# Patient Record
Sex: FEMALE | Race: WHITE | NOT HISPANIC OR LATINO | Employment: OTHER | URBAN - METROPOLITAN AREA
[De-identification: names, ages, dates, MRNs, and addresses within clinical notes are randomized per-mention and may not be internally consistent; named-entity substitution may affect disease eponyms.]

---

## 2017-02-14 DIAGNOSIS — R07.81 PLEURITIC CHEST PAIN: Primary | ICD-10-CM

## 2017-03-14 ENCOUNTER — OFFICE VISIT (OUTPATIENT)
Dept: SURGERY | Facility: CLINIC | Age: 72
End: 2017-03-14

## 2017-03-14 VITALS
HEART RATE: 70 BPM | WEIGHT: 127 LBS | RESPIRATION RATE: 16 BRPM | DIASTOLIC BLOOD PRESSURE: 60 MMHG | HEIGHT: 65 IN | SYSTOLIC BLOOD PRESSURE: 122 MMHG | OXYGEN SATURATION: 99 % | BODY MASS INDEX: 21.16 KG/M2

## 2017-03-14 DIAGNOSIS — D36.10 NEURILEMMOMA: Primary | ICD-10-CM

## 2017-03-14 PROCEDURE — 99214 OFFICE O/P EST MOD 30 MIN: CPT | Performed by: THORACIC SURGERY (CARDIOTHORACIC VASCULAR SURGERY)

## 2017-03-14 RX ORDER — CHLORAL HYDRATE 500 MG
CAPSULE ORAL DAILY
COMMUNITY
Start: 2014-09-17

## 2017-03-14 RX ORDER — MULTIVIT WITH MINERALS/LUTEIN
TABLET ORAL DAILY
COMMUNITY
Start: 2013-11-05

## 2017-03-14 NOTE — PROGRESS NOTES
Subjective   Patient ID: Edyta White is a 71 y.o. female who presents to the office today for a follow up visit after worsening right sided chest wall pain ct chest without contrast.    History of Present Illness  Dear Colleague,  Edyta White was seen in our office today for evaluation of new onset right posterior chest wall pain associated with numbness and paresthesias.  I have seen and examined the patient.  I have reviewed her x-rays.    Mrs. White is 71-year-old  female is well known to me.  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  I followed her for several years afterwards with no evidence of recurrence.  I released her from my care.  Recently she has noticed some discomfort in her right posterior chest wall.  It is intermittent.  This is between the spine and the scapula.  It is associated with a numbness and paresthesias.  It does not radiate.  Nothing seems to make it worse or better.  It does not cause shortness of breath.  It is not pleuritic in nature.  She is experiencing no shortness of breath, no cough, and no weight loss.  She has had no fever chills or night sweats.    The following portions of the patient's history were reviewed and updated as appropriate: allergies, current medications, past family history, past medical history, past social history, past surgical history and problem list.  Review of Systems   Constitution: Negative.   HENT: Negative.    Eyes: Negative.    Cardiovascular: Negative.    Respiratory: Negative.         Right sided chest wall pain   Endocrine: Negative.    Hematologic/Lymphatic: Negative.    Skin: Negative.    Musculoskeletal: Negative.    Gastrointestinal: Negative.    Genitourinary: Negative.    Neurological: Negative.    Psychiatric/Behavioral: Negative.      Patient Active Problem List   Diagnosis   • Disease of lung   • Neurilemmoma   • Seasonal allergic rhinitis   • Spinal  stenosis   • Routine health maintenance   • Intermittent palpitations     Past Medical History   Diagnosis Date   • Frozen shoulder    • Mediastinal tumor      Schwannoma    • Seasonal allergies      Past Surgical History   Procedure Laterality Date   • Lung surgery  2011     Removal of schwannoma   • Breast augmentation     • Tubal abdominal ligation       Family History   Problem Relation Age of Onset   • Brain cancer Mother    • Coronary artery disease Father      Social History     Social History   • Marital status:      Spouse name: N/A   • Number of children: N/A   • Years of education: N/A     Occupational History   • Not on file.     Social History Main Topics   • Smoking status: Never Smoker   • Smokeless tobacco: Not on file   • Alcohol use No   • Drug use: Not on file   • Sexual activity: Not on file     Other Topics Concern   • Not on file     Social History Narrative       Current Outpatient Prescriptions:   •  ascorbic acid (VITAMIN C) 1000 MG tablet, Take  by mouth Daily., Disp: , Rfl:   •  Cyanocobalamin (B-12) 1000 MCG tablet controlled-release, Take 1 capsule by mouth., Disp: , Rfl:   •  Multiple Vitamins-Minerals (MULTIVITAL) tablet, Take 1 tablet by mouth daily., Disp: , Rfl:   •  Omega-3 Fatty Acids (FISH OIL) 1000 MG capsule capsule, Take  by mouth Daily., Disp: , Rfl:   Allergies   Allergen Reactions   • Sulfa Antibiotics         Objective   Vitals:    03/14/17 0931   BP: 122/60   Pulse: 70   Resp: 16   SpO2: 99%     Physical Exam   Constitutional: She is oriented to person, place, and time. Vital signs are normal. She appears well-developed.   HENT:   Head: Normocephalic and atraumatic.   Neck: Normal range of motion. Neck supple.   Cardiovascular: Normal rate, regular rhythm, normal heart sounds and intact distal pulses.    No murmur heard.  Pulmonary/Chest: Effort normal and breath sounds normal. She has no wheezes. She has no rhonchi. She has no rales. She exhibits no tenderness.    Abdominal: Soft. There is no tenderness.   Musculoskeletal: Normal range of motion. She exhibits no edema or tenderness.   Neurological: She is alert and oriented to person, place, and time. She has normal strength.   Skin: Skin is warm and dry. No rash noted. No cyanosis or erythema.   Psychiatric: She has a normal mood and affect. Her behavior is normal.     Independent Review of Radiographic Studies:    I have independently reviewed CT scan of the chest performed March 7, 2017.  There is no evidence of recurrence of the posterior mediastinal tumor.  There are no acute findings in the chest.  There is stable bilateral apical scarring with some nodularity that most likely is postinflammatory.  There is remote granulomatous disease. There are multiple small bilateral parenchymal subpleural and pleural-based nodules dating back to 2014.  There is mild bilateral upper lobe cylindrical bronchiectasis.  There is no hilar mediastinal or axillary adenopathy of concern.    Assessment/Plan       I see nothing in the CT scan or on physical exam to explain her symptoms.  They are relatively mild and I believe mainly concerned her for possible recurrence of the tumor.  I reassured her that there was no recurrence and no new tumor.  I suggested that she continue her exercise routine and perhaps use heat to the area as well as deep massage.  If her symptoms persist or worsen I will be glad to see her at anytime in the future.  Thank you for allowing me to participate in the care of Mrs. White.    Diagnoses and all orders for this visit:    Neurilemmoma

## 2017-05-02 ENCOUNTER — OFFICE VISIT (OUTPATIENT)
Dept: INTERNAL MEDICINE | Age: 72
End: 2017-05-02

## 2017-05-02 VITALS
DIASTOLIC BLOOD PRESSURE: 58 MMHG | WEIGHT: 127 LBS | OXYGEN SATURATION: 99 % | TEMPERATURE: 99.3 F | BODY MASS INDEX: 20.41 KG/M2 | HEIGHT: 66 IN | SYSTOLIC BLOOD PRESSURE: 112 MMHG | HEART RATE: 84 BPM

## 2017-05-02 DIAGNOSIS — J06.9 ACUTE URI: Primary | ICD-10-CM

## 2017-05-02 PROCEDURE — 99213 OFFICE O/P EST LOW 20 MIN: CPT | Performed by: INTERNAL MEDICINE

## 2017-05-08 ENCOUNTER — TELEPHONE (OUTPATIENT)
Dept: INTERNAL MEDICINE | Age: 72
End: 2017-05-08

## 2017-05-08 DIAGNOSIS — R05.3 CHRONIC COUGH: Primary | ICD-10-CM

## 2017-07-17 ENCOUNTER — OFFICE VISIT (OUTPATIENT)
Dept: INTERNAL MEDICINE | Age: 72
End: 2017-07-17

## 2017-07-17 VITALS
DIASTOLIC BLOOD PRESSURE: 50 MMHG | OXYGEN SATURATION: 98 % | HEIGHT: 66 IN | WEIGHT: 129.4 LBS | BODY MASS INDEX: 20.79 KG/M2 | SYSTOLIC BLOOD PRESSURE: 112 MMHG | TEMPERATURE: 98.6 F | HEART RATE: 74 BPM

## 2017-07-17 DIAGNOSIS — R00.2 INTERMITTENT PALPITATIONS: ICD-10-CM

## 2017-07-17 DIAGNOSIS — M17.11 ARTHRITIS OF KNEE, RIGHT: ICD-10-CM

## 2017-07-17 DIAGNOSIS — R60.0 LOCALIZED EDEMA: Primary | ICD-10-CM

## 2017-07-17 PROBLEM — J06.9 ACUTE URI: Status: RESOLVED | Noted: 2017-05-02 | Resolved: 2017-07-17

## 2017-07-17 PROCEDURE — 99214 OFFICE O/P EST MOD 30 MIN: CPT | Performed by: INTERNAL MEDICINE

## 2017-07-17 NOTE — PROGRESS NOTES
"Edyta White / 71 y.o. / female  07/17/2017  VITALS    /50  Pulse 74  Temp 98.6 °F (37 °C)  Ht 66\" (167.6 cm)  Wt 129 lb 6.4 oz (58.7 kg)  SpO2 98%  BMI 20.89 kg/m2  BP Readings from Last 3 Encounters:   07/17/17 112/50   05/02/17 112/58   04/29/17 111/68     Wt Readings from Last 3 Encounters:   07/17/17 129 lb 6.4 oz (58.7 kg)   05/02/17 127 lb (57.6 kg)   04/29/17 126 lb (57.2 kg)      Body mass index is 20.89 kg/(m^2).    CC:  Main reason(s) for today's visit: Edema (bilat)      HPI:     Patient notes chronic issue with swelling in both lower extremities..  Over the past several months, this has seemed to get worse.  It is typically worse when she is on her feet for the majority the day and worse at the end of the day.  When she arises in the morning the symptoms have improved.  She does have significant varicosities on the right side, and she does comment that this is the side with the predominance of the edema.  She also notes that her legs feel heavy.  These symptoms seem to extend into the thighs bilaterally.  She notes that the varicosities in the right occurred relatively recently, she does wear compression garments around the knee when she plays golf twice a week for at least 3-4 hours per day.    Palpitations, chronic issue, may have been evaluated prior to 2014 with an event recorder, patient is unsure if that actually happened.  She has no symptoms with this and denied chest pain, shortness of breath or dizziness.  We discussed that the only way to identify an intermittent issue like this is to have an event recorder placed.    Arthritis right knee, status post Synvisc injection ×2 without significant improvement.  Physical therapy is about to begin.  At this point she would like to avoid surgery if at all possible which I think is a logical maneuver.    Patient Care Team:  Zi Walker MD as PCP - General  Zi Walker MD as PCP - Family " Medicine    ____________________________________________________________________    ASSESSMENT & PLAN:    Problem List Items Addressed This Visit        Unprioritized    Intermittent palpitations      Other Visit Diagnoses     Localized edema    -  Primary    Relevant Orders    CT Abdomen Pelvis With Contrast    Arthritis of knee, right            Orders Placed This Encounter   Procedures   • CT Abdomen Pelvis With Contrast       Summary/Discussion:     ·   Number-one edema at this time not significant clinically.  Recommend patient utilize elevation at night, as the weather cools she may consider support stockings which we can provide prescription for.  If the symptoms worsen, we would consider referral on to vascular surgery.  Will also check CT scan abdomen and pelvis because of the relatively rapid onset of varicosities.    #2 intermittent palpitations, not associated with untoward symptoms.  Patient is not inclined to pursue 38 at recorder at this time we'll continue to observe and let me know if they worsen.    #3 arthritis right knee, patient is currently being seen by orthopedics and she is in the process of beginning physical therapy.  She has tried Synvisc injection ×2 with plus minus results.    Return if symptoms worsen or fail to improve.    No future appointments.    ______________________________________________________    REVIEW OF SYSTEMS    Review of Systems   Respiratory: Negative for chest tightness and shortness of breath.    Cardiovascular: Negative for chest pain and palpitations.   Neurological: Negative for dizziness, syncope, speech difficulty, weakness, light-headedness and headaches.           PHYSICAL EXAMINATION    Physical Exam   Constitutional: She is oriented to person, place, and time. She appears well-developed and well-nourished. No distress.   Cardiovascular: Normal rate, regular rhythm, normal heart sounds and intact distal pulses.  Exam reveals no gallop and no friction rub.     No murmur heard.  Pulmonary/Chest: Effort normal and breath sounds normal. She has no wheezes. She has no rales.   Musculoskeletal: She exhibits edema.   Trace edema noted distally lower extremities bilaterally.    Varicose Veins more prominent on the right than the left   Neurological: She is alert and oriented to person, place, and time.   Skin: Skin is warm and dry. No rash noted.   Psychiatric: She has a normal mood and affect. Her behavior is normal. Judgment and thought content normal.   Nursing note and vitals reviewed.        REVIEWED DATA:    Labs:   No results found for: NA, K, AST, ALT, BUN, CREATININE, EGFRIFNONA, EGFRIFAFRI     No results found for: HGBA1C, GLU, MICROALBUR    No results found for: LDL, HDL, TRIG, CHOLHDLRATIO    No results found for: TSH, FREET4     No results found for: WBC, HGB, PLT     Imaging:        Medical Tests:        Summary of old records / correspondence / consultant report:        Request outside records:        Allergies   Allergen Reactions   • Azithromycin Itching   • Sulfa Antibiotics        Current Outpatient Prescriptions   Medication Sig Dispense Refill   • ascorbic acid (VITAMIN C) 1000 MG tablet Take  by mouth Daily.     • Biotin 300 MCG tablet Take  by mouth.     • Omega-3 Fatty Acids (FISH OIL) 1000 MG capsule capsule Take  by mouth Daily.       No current facility-administered medications for this visit.        PFSH:     The following portions of the patient's history were reviewed and updated as appropriate: Allergies / Current Medications / Past Medical History / Surgical History / Social History / Family History    Patient Active Problem List   Diagnosis   • Disease of lung   • Neurilemmoma   • Seasonal allergic rhinitis   • Spinal stenosis   • Routine health maintenance   • Intermittent palpitations       Past Medical History:   Diagnosis Date   • Frozen shoulder    • Mediastinal tumor     Schwannoma    • Seasonal allergies        Past Surgical History:    Procedure Laterality Date   • BREAST AUGMENTATION     • LUNG SURGERY  2011    Removal of schwannoma   • TUBAL ABDOMINAL LIGATION         Social History     Social History   • Marital status:      Spouse name: N/A   • Number of children: N/A   • Years of education: N/A     Social History Main Topics   • Smoking status: Never Smoker   • Smokeless tobacco: Never Used   • Alcohol use No   • Drug use: None   • Sexual activity: Not Asked     Other Topics Concern   • None     Social History Narrative       Family History   Problem Relation Age of Onset   • Brain cancer Mother    • Coronary artery disease Father          **Lucrecia Disclaimer:   Much of this encounter note is an electronic transcription/translation of spoken language to printed text. The electronic translation of spoken language may permit erroneous, or at times, nonsensical words or phrases to be inadvertently transcribed. Although I have reviewed the note for such errors, some may still exist.

## 2018-07-23 ENCOUNTER — OFFICE (OUTPATIENT)
Dept: URBAN - METROPOLITAN AREA CLINIC 66 | Facility: CLINIC | Age: 73
End: 2018-07-23

## 2018-07-23 VITALS — DIASTOLIC BLOOD PRESSURE: 71 MMHG | HEART RATE: 71 BPM | SYSTOLIC BLOOD PRESSURE: 118 MMHG | WEIGHT: 128 LBS

## 2018-07-23 DIAGNOSIS — R13.10 DYSPHAGIA, UNSPECIFIED: ICD-10-CM

## 2018-07-23 DIAGNOSIS — K59.00 CONSTIPATION, UNSPECIFIED: ICD-10-CM

## 2018-07-23 DIAGNOSIS — R19.4 CHANGE IN BOWEL HABIT: ICD-10-CM

## 2018-07-23 DIAGNOSIS — K30 FUNCTIONAL DYSPEPSIA: ICD-10-CM

## 2018-07-23 PROCEDURE — 99214 OFFICE O/P EST MOD 30 MIN: CPT | Performed by: NURSE PRACTITIONER

## 2018-08-01 ENCOUNTER — OFFICE (OUTPATIENT)
Dept: URBAN - METROPOLITAN AREA CLINIC 66 | Facility: CLINIC | Age: 73
End: 2018-08-01

## 2018-08-01 VITALS
HEART RATE: 76 BPM | HEIGHT: 66 IN | WEIGHT: 128 LBS | DIASTOLIC BLOOD PRESSURE: 70 MMHG | SYSTOLIC BLOOD PRESSURE: 112 MMHG

## 2018-08-01 DIAGNOSIS — R13.10 DYSPHAGIA, UNSPECIFIED: ICD-10-CM

## 2018-08-01 DIAGNOSIS — K59.01 SLOW TRANSIT CONSTIPATION: ICD-10-CM

## 2018-08-01 DIAGNOSIS — R19.4 CHANGE IN BOWEL HABIT: ICD-10-CM

## 2018-08-01 PROCEDURE — 99212 OFFICE O/P EST SF 10 MIN: CPT | Performed by: INTERNAL MEDICINE

## 2018-08-13 ENCOUNTER — AMBULATORY SURGICAL CENTER (OUTPATIENT)
Dept: URBAN - METROPOLITAN AREA SURGERY 20 | Facility: SURGERY | Age: 73
End: 2018-08-13
Payer: COMMERCIAL

## 2018-08-13 ENCOUNTER — OFFICE (OUTPATIENT)
Dept: URBAN - METROPOLITAN AREA PATHOLOGY 4 | Facility: PATHOLOGY | Age: 73
End: 2018-08-13

## 2018-08-13 VITALS — HEIGHT: 66 IN

## 2018-08-13 DIAGNOSIS — K29.70 GASTRITIS, UNSPECIFIED, WITHOUT BLEEDING: ICD-10-CM

## 2018-08-13 DIAGNOSIS — R13.10 DYSPHAGIA, UNSPECIFIED: ICD-10-CM

## 2018-08-13 DIAGNOSIS — K31.89 OTHER DISEASES OF STOMACH AND DUODENUM: ICD-10-CM

## 2018-08-13 DIAGNOSIS — K22.2 ESOPHAGEAL OBSTRUCTION: ICD-10-CM

## 2018-08-13 DIAGNOSIS — K57.30 DIVERTICULOSIS OF LARGE INTESTINE WITHOUT PERFORATION OR ABS: ICD-10-CM

## 2018-08-13 DIAGNOSIS — K21.0 GASTRO-ESOPHAGEAL REFLUX DISEASE WITH ESOPHAGITIS: ICD-10-CM

## 2018-08-13 DIAGNOSIS — R19.4 CHANGE IN BOWEL HABIT: ICD-10-CM

## 2018-08-13 DIAGNOSIS — K59.01 SLOW TRANSIT CONSTIPATION: ICD-10-CM

## 2018-08-13 LAB
GI HISTOLOGY: A. SELECT: (no result)
GI HISTOLOGY: B. SELECT: (no result)
GI HISTOLOGY: C. SELECT: (no result)
GI HISTOLOGY: PDF REPORT: (no result)

## 2018-08-13 PROCEDURE — 43450 DILATE ESOPHAGUS 1/MULT PASS: CPT | Performed by: INTERNAL MEDICINE

## 2018-08-13 PROCEDURE — 88305 TISSUE EXAM BY PATHOLOGIST: CPT | Performed by: INTERNAL MEDICINE

## 2018-08-13 PROCEDURE — 45378 DIAGNOSTIC COLONOSCOPY: CPT | Performed by: INTERNAL MEDICINE

## 2018-08-13 PROCEDURE — 43239 EGD BIOPSY SINGLE/MULTIPLE: CPT | Performed by: INTERNAL MEDICINE

## 2018-11-06 ENCOUNTER — OFFICE (OUTPATIENT)
Dept: URBAN - METROPOLITAN AREA CLINIC 66 | Facility: CLINIC | Age: 73
End: 2018-11-06

## 2018-11-06 VITALS
WEIGHT: 125 LBS | DIASTOLIC BLOOD PRESSURE: 72 MMHG | HEART RATE: 68 BPM | HEIGHT: 66 IN | SYSTOLIC BLOOD PRESSURE: 110 MMHG

## 2018-11-06 DIAGNOSIS — K57.90 DIVERTICULOSIS OF INTESTINE, PART UNSPECIFIED, WITHOUT PERFO: ICD-10-CM

## 2018-11-06 DIAGNOSIS — K59.01 SLOW TRANSIT CONSTIPATION: ICD-10-CM

## 2018-11-06 DIAGNOSIS — K56.690 OTHER PARTIAL INTESTINAL OBSTRUCTION: ICD-10-CM

## 2018-11-06 PROCEDURE — 99213 OFFICE O/P EST LOW 20 MIN: CPT | Performed by: INTERNAL MEDICINE

## 2018-11-09 ENCOUNTER — TRANSCRIBE ORDERS (OUTPATIENT)
Dept: ADMINISTRATIVE | Facility: HOSPITAL | Age: 73
End: 2018-11-09

## 2018-11-09 DIAGNOSIS — K59.01 SLOW TRANSIT CONSTIPATION: Primary | ICD-10-CM

## 2018-12-03 ENCOUNTER — HOSPITAL ENCOUNTER (OUTPATIENT)
Dept: GENERAL RADIOLOGY | Facility: HOSPITAL | Age: 73
Discharge: HOME | End: 2018-12-03
Attending: INTERNAL MEDICINE | Admitting: INTERNAL MEDICINE

## 2018-12-03 DIAGNOSIS — K59.01 SLOW TRANSIT CONSTIPATION: ICD-10-CM

## 2018-12-03 PROCEDURE — 74270 X-RAY XM COLON 1CNTRST STD: CPT

## 2018-12-03 RX ADMIN — BARIUM SULFATE 300 ML: 1.05 SUSPENSION ORAL; RECTAL at 09:25

## 2019-02-11 ENCOUNTER — OFFICE VISIT (OUTPATIENT)
Dept: INTERNAL MEDICINE | Age: 74
End: 2019-02-11

## 2019-02-11 VITALS
TEMPERATURE: 98.2 F | OXYGEN SATURATION: 100 % | WEIGHT: 128.2 LBS | DIASTOLIC BLOOD PRESSURE: 68 MMHG | HEIGHT: 66 IN | BODY MASS INDEX: 20.6 KG/M2 | SYSTOLIC BLOOD PRESSURE: 122 MMHG | HEART RATE: 71 BPM

## 2019-02-11 DIAGNOSIS — Z00.00 PREVENTATIVE HEALTH CARE: ICD-10-CM

## 2019-02-11 DIAGNOSIS — G62.9 NEUROPATHY: Primary | ICD-10-CM

## 2019-02-11 DIAGNOSIS — D36.10 NEURILEMMOMA: ICD-10-CM

## 2019-02-11 DIAGNOSIS — Z13.220 SCREENING FOR LIPID DISORDERS: ICD-10-CM

## 2019-02-11 DIAGNOSIS — Z12.39 BREAST CANCER SCREENING: ICD-10-CM

## 2019-02-11 DIAGNOSIS — Z78.0 POST-MENOPAUSAL: ICD-10-CM

## 2019-02-11 PROBLEM — M17.0 BILATERAL PRIMARY OSTEOARTHRITIS OF KNEE: Status: ACTIVE | Noted: 2019-02-11

## 2019-02-11 PROCEDURE — 99214 OFFICE O/P EST MOD 30 MIN: CPT | Performed by: NURSE PRACTITIONER

## 2019-02-11 RX ORDER — MAGNESIUM OXIDE 400 MG/1
400 TABLET ORAL DAILY
COMMUNITY

## 2019-02-11 RX ORDER — LANOLIN ALCOHOL/MO/W.PET/CERES
1000 CREAM (GRAM) TOPICAL DAILY
COMMUNITY

## 2019-02-11 RX ORDER — GINSENG 100 MG
CAPSULE ORAL
COMMUNITY
Start: 2016-07-18 | End: 2019-02-11

## 2019-02-11 RX ORDER — MELATONIN
1000 DAILY
COMMUNITY

## 2019-02-11 RX ORDER — LYSINE HCL 500 MG
500 TABLET ORAL DAILY
COMMUNITY

## 2019-02-11 NOTE — PROGRESS NOTES
"Subjective   Edyta White is a 73 y.o. female.     History of Present Illness     Patient here to establish care with me, was a previous patient of Dr. Walker.    She has provided today a report of neurodiagnostic imaging that was performed by a Dr. Hough. 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 She also reports she was recommended by him to have some further unspecified diagnostic testing for upper extremity neuropathy as well but reports he told her \"not to see anyone at [facility] for this\". She was actually recommended to follow up with either the [facility] or [place], but reports no further referral has been initiated by her specialist at this time. She is here today to discuss further evaluation and next steps.     The following portions of the patient's history were reviewed and updated as appropriate: allergies, current medications, past family history, past medical history, past social history, past surgical history and problem list.    Review of Systems   Constitutional: Negative for activity change and appetite change.   Respiratory: Negative for shortness of breath.    Cardiovascular: Negative for chest pain, palpitations and leg swelling.   Genitourinary: Negative for breast pain.   Neurological: Positive for weakness (left foot).        Tingling of left leg/ foot        Objective   Physical Exam   Constitutional: She is oriented to person, place, and time. She appears well-developed and well-nourished. She is cooperative. She does not appear ill. No distress.   Neurological: She is alert and oriented to person, place, and time.   Psychiatric: She has a normal mood and affect. Her speech is normal and behavior is normal. Judgment and thought content normal. Cognition and memory are normal.   Nursing note and vitals reviewed.        Assessment/Plan   Problems Addressed this Visit        Nervous and " Auditory    Neuropathy - Primary    Relevant Orders    CBC & Differential    Comprehensive Metabolic Panel    TSH Rfx On Abnormal To Free T4    Vitamin B12       Other    Neurilemmoma      Other Visit Diagnoses     Breast cancer screening        Relevant Orders    MRI breast bilateral screening w wo contrast    Post-menopausal        Relevant Orders    MRI breast bilateral screening w wo contrast    Preventative health care        Relevant Orders    Lipid Panel With / Chol / HDL Ratio    Screening for lipid disorders        Relevant Orders    Lipid Panel With / Chol / HDL Ratio        1. Neuropathy  Reviewed report from Dr. Hough the patient has provided regarding recent evaluation of left lower extremity neuropathy.  According to his recommendations, the patient needs some more significant workup to evaluate for upper extremity neuropathy and more extensive workup, especially considering her history of schwannoma in the past.  Discussed with patient that this level of specialty is out of my scope of practice and recommend that she needs to follow up with neurology for further testing. She reports she will discuss with Dr. Hough to determine what his recommendations are as far as neurologist and I will place order once she has this information to me.      - CBC & Differential  - Comprehensive Metabolic Panel  - TSH Rfx On Abnormal To Free T4  - Vitamin B12    2. Neurilemmoma      3. Breast cancer screening  History of breast implants, has had breast MRI in the past in [place]. Mammogram out of date.  Will attempt to order through [facility] an open MRI.  If they're not offering this, discussed with patient she will likely have to travel to [place] as she did previously to have this screening exam done.    - MRI breast bilateral screening w wo contrast; Future    4. Post-menopausal    - MRI breast bilateral screening w wo contrast; Future    5. Preventative health care    - Lipid Panel With / Chol / HDL Ratio    6.  Screening for lipid disorders    - Lipid Panel With / Chol / HDL Ratio

## 2019-03-05 ENCOUNTER — APPOINTMENT (OUTPATIENT)
Dept: MRI IMAGING | Facility: HOSPITAL | Age: 74
End: 2019-03-05

## 2019-03-26 ENCOUNTER — HOSPITAL ENCOUNTER (OUTPATIENT)
Dept: MRI IMAGING | Facility: HOSPITAL | Age: 74
Discharge: HOME | End: 2019-03-26
Admitting: NURSE PRACTITIONER

## 2019-03-26 DIAGNOSIS — Z78.0 POST-MENOPAUSAL: ICD-10-CM

## 2019-03-26 DIAGNOSIS — Z12.39 BREAST CANCER SCREENING: ICD-10-CM

## 2019-03-26 LAB — CREAT BLDA-MCNC: 1 MG/DL (ref 0.6–1.3)

## 2019-03-26 PROCEDURE — 0 GADOBENATE DIMEGLUMINE 529 MG/ML SOLUTION: Performed by: NURSE PRACTITIONER

## 2019-03-26 PROCEDURE — A9577 INJ MULTIHANCE: HCPCS | Performed by: NURSE PRACTITIONER

## 2019-03-26 PROCEDURE — C8908 MRI W/O FOL W/CONT, BREAST,: HCPCS

## 2019-03-26 PROCEDURE — 82565 ASSAY OF CREATININE: CPT

## 2019-03-26 RX ADMIN — GADOBENATE DIMEGLUMINE 11 ML: 529 INJECTION, SOLUTION INTRAVENOUS at 11:42

## 2020-01-23 RX ORDER — LEVOCETIRIZINE DIHYDROCHLORIDE 5 MG/1
5 TABLET, FILM COATED ORAL EVERY EVENING
Qty: 90 TABLET | Refills: 3 | Status: SHIPPED | OUTPATIENT
Start: 2020-01-23 | End: 2020-01-24 | Stop reason: SDUPTHER

## 2020-01-23 NOTE — TELEPHONE ENCOUNTER
Patient has been buying Zyzol OTC and she can save a lot of money if Ania will send in for a years prescription to [facility]. It will only cost her $20.00.     RX can be sent to:   [facility]   [facility] [place]  P: [phone]

## 2020-01-24 RX ORDER — LEVOCETIRIZINE DIHYDROCHLORIDE 5 MG/1
5 TABLET, FILM COATED ORAL EVERY EVENING
Qty: 90 TABLET | Refills: 3 | Status: SHIPPED | OUTPATIENT
Start: 2020-01-24 | End: 2020-05-28

## 2020-01-24 NOTE — TELEPHONE ENCOUNTER
Patient called the office asked why her rx had not been filled. It looks like it was sent to [facility] and not [facility].  Can we please send it to [facility] where the patient initially requested it to go

## 2020-05-28 RX ORDER — LEVOCETIRIZINE DIHYDROCHLORIDE 5 MG/1
5 TABLET, FILM COATED ORAL EVERY EVENING
Qty: 90 TABLET | Refills: 3 | Status: SHIPPED | OUTPATIENT
Start: 2020-05-28

## 2020-05-28 NOTE — TELEPHONE ENCOUNTER
PATIENT CALLED FOR MED REFILL    levocetirizine (XYZAL) 5 MG tablet    90 DAY  SUPPLY    [facility]/pharmacy #[id number] - [place], [place] - [address] - [phone]  - [phone]   [phone]    PATIENT CALL BACK NUMBER [phone]

## 2020-09-28 ENCOUNTER — OFFICE VISIT (OUTPATIENT)
Dept: INTERNAL MEDICINE | Age: 75
End: 2020-09-28

## 2020-09-28 VITALS
TEMPERATURE: 96.5 F | BODY MASS INDEX: 21.05 KG/M2 | OXYGEN SATURATION: 99 % | HEIGHT: 66 IN | HEART RATE: 74 BPM | WEIGHT: 131 LBS | SYSTOLIC BLOOD PRESSURE: 106 MMHG | DIASTOLIC BLOOD PRESSURE: 62 MMHG

## 2020-09-28 DIAGNOSIS — R59.9 REACTIVE LYMPHADENOPATHY: Primary | ICD-10-CM

## 2020-09-28 LAB
ALBUMIN SERPL-MCNC: 4.6 G/DL (ref 3.5–5.2)
ALBUMIN/GLOB SERPL: 2.4 G/DL
ALP SERPL-CCNC: 71 U/L (ref 39–117)
ALT SERPL-CCNC: 26 U/L (ref 1–33)
AST SERPL-CCNC: 28 U/L (ref 1–32)
BASOPHILS # BLD AUTO: 0.06 10*3/MM3 (ref 0–0.2)
BASOPHILS NFR BLD AUTO: 0.9 % (ref 0–1.5)
BILIRUB SERPL-MCNC: 0.4 MG/DL (ref 0–1.2)
BUN SERPL-MCNC: 15 MG/DL (ref 8–23)
BUN/CREAT SERPL: 17.2 (ref 7–25)
CALCIUM SERPL-MCNC: 9.2 MG/DL (ref 8.6–10.5)
CHLORIDE SERPL-SCNC: 102 MMOL/L (ref 98–107)
CO2 SERPL-SCNC: 29.9 MMOL/L (ref 22–29)
CREAT SERPL-MCNC: 0.87 MG/DL (ref 0.57–1)
EOSINOPHIL # BLD AUTO: 0.17 10*3/MM3 (ref 0–0.4)
EOSINOPHIL NFR BLD AUTO: 2.6 % (ref 0.3–6.2)
ERYTHROCYTE [DISTWIDTH] IN BLOOD BY AUTOMATED COUNT: 13.3 % (ref 12.3–15.4)
GLOBULIN SER CALC-MCNC: 1.9 GM/DL
GLUCOSE SERPL-MCNC: 85 MG/DL (ref 65–99)
HCT VFR BLD AUTO: 38.4 % (ref 34–46.6)
HGB BLD-MCNC: 12.9 G/DL (ref 12–15.9)
IMM GRANULOCYTES # BLD AUTO: 0.03 10*3/MM3 (ref 0–0.05)
IMM GRANULOCYTES NFR BLD AUTO: 0.5 % (ref 0–0.5)
LYMPHOCYTES # BLD AUTO: 2.34 10*3/MM3 (ref 0.7–3.1)
LYMPHOCYTES NFR BLD AUTO: 36.4 % (ref 19.6–45.3)
MCH RBC QN AUTO: 28.6 PG (ref 26.6–33)
MCHC RBC AUTO-ENTMCNC: 33.6 G/DL (ref 31.5–35.7)
MCV RBC AUTO: 85.1 FL (ref 79–97)
MONOCYTES # BLD AUTO: 0.46 10*3/MM3 (ref 0.1–0.9)
MONOCYTES NFR BLD AUTO: 7.2 % (ref 5–12)
NEUTROPHILS # BLD AUTO: 3.36 10*3/MM3 (ref 1.7–7)
NEUTROPHILS NFR BLD AUTO: 52.4 % (ref 42.7–76)
NRBC BLD AUTO-RTO: 0 /100 WBC (ref 0–0.2)
PLATELET # BLD AUTO: 259 10*3/MM3 (ref 140–450)
POTASSIUM SERPL-SCNC: 4.1 MMOL/L (ref 3.5–5.2)
PROT SERPL-MCNC: 6.5 G/DL (ref 6–8.5)
RBC # BLD AUTO: 4.51 10*6/MM3 (ref 3.77–5.28)
SODIUM SERPL-SCNC: 140 MMOL/L (ref 136–145)
WBC # BLD AUTO: 6.42 10*3/MM3 (ref 3.4–10.8)

## 2020-09-28 PROCEDURE — 99214 OFFICE O/P EST MOD 30 MIN: CPT | Performed by: NURSE PRACTITIONER

## 2020-09-28 NOTE — PROGRESS NOTES
[facility] [place] [facility]ICINE  Ania White / 74 y.o. / female  09/28/2020      ASSESSMENT & PLAN:    Problem List Items Addressed This Visit     None      Visit Diagnoses     Reactive lymphadenopathy    -  Primary    Relevant Orders    CBC & Differential    Comprehensive Metabolic Panel        Orders Placed This Encounter   Procedures   • Comprehensive Metabolic Panel   • CBC & Differential     No orders of the defined types were placed in this encounter.      Summary/Discussion:    1. Reactive lymphadenopathy  Suspect likely reactive lymphadenopathy to recent flu shot.  Symptoms have significantly improved and continue to do so.  She is up-to-date on her breast cancer screening/MRI, although this will be due in approximately 6 months.  Check basic labs today as she has not had lab work for approximately 3 years.  Recommend continued monitoring, if no resolution in symptoms in 1 to 2 weeks, please follow-up with me, may need to consider breast MRI and/or ultrasound of area for further evaluation.    - CBC & Differential  - Comprehensive Metabolic Panel        Return in about 6 months (around 3/28/2021) for Next scheduled follow up, follow up as needed in 1-2 weeks .    ____________________________________________________________________    MEDICATIONS  Current Outpatient Medications   Medication Sig Dispense Refill   • ascorbic acid (VITAMIN C) 1000 MG tablet Take  by mouth Daily.     • cholecalciferol (VITAMIN D3) 1000 units tablet Take 1,000 Units by mouth Daily.     • Glucos-Chond-Hyal Ac-Ca Fructo (MOVE FREE JOINT HEALTH ADVANCE PO) Take  by mouth.     • levocetirizine (Xyzal) 5 MG tablet Take 1 tablet by mouth Every Evening. 90 tablet 3   • magnesium oxide (MAG-OX) 400 MG tablet Take 400 mg by mouth Daily.     • Omega-3 Fatty Acids (FISH OIL) 1000 MG capsule capsule Take  by mouth Daily.     • pyridoxine (VITAMIN B-6) 500 MG tablet Take 500 mg by mouth Daily.     • vitamin B-12  "(CYANOCOBALAMIN) 1000 MCG tablet Take 1,000 mcg by mouth Daily.       No current facility-administered medications for this visit.           VITALS:    Visit Vitals  /62   Pulse 74   Temp 96.5 °F (35.8 °C) (Temporal)   Ht 167.6 cm (65.98\")   Wt 59.4 kg (131 lb)   SpO2 99%   BMI 21.15 kg/m²       BP Readings from Last 3 Encounters:   09/28/20 106/62   02/11/19 122/68   07/17/17 112/50     Wt Readings from Last 3 Encounters:   09/28/20 59.4 kg (131 lb)   03/26/19 56.7 kg (125 lb)   02/11/19 58.2 kg (128 lb 3.2 oz)      Body mass index is 21.15 kg/m².    CC:  Main reason(s) for today's visit: left arm lump (under armpit )      HPI:     Patient here for evaluation of swelling of left axillary area.  She noticed enlarged lymph nodes in her left arm approximately 4 days ago.  Of note, she did have high-dose flu vaccine the day before she noticed the enlarged swelling.  The area was mildly tender.  She reports significant improvement in symptoms since they started.  Has history of previous bilateral breast MRI done March 2019, no previous history of breast cancer, no significant family history of breast cancer.  She has not noticed any other breast lumps or swelling.  Otherwise feels okay, no fevers, chills, body aches.  She denies any localized reaction to the flu vaccine that she had in her left arm.    Patient Care Team:  Ania Bishop APRN as PCP - General (Internal Medicine)  Allen Hagan MD as Consulting Physician (Gastroenterology)  Christiano Strickland MD as Consulting Physician (Sports Medicine)  Hilda Riddle MD as Consulting Physician (Obstetrics and Gynecology)    ____________________________________________________________________    REVIEW OF SYSTEMS    Review of Systems   Constitutional: Negative for chills, fatigue and fever.   Cardiovascular: Negative for chest pain.   Allergic/Immunologic: Negative for immunocompromised state.         PHYSICAL EXAMINATION    Physical Exam  Vitals signs and " nursing note reviewed.   Constitutional:       General: She is not in acute distress.     Appearance: She is well-developed. She is not ill-appearing.   Lymphadenopathy:      Head:      Right side of head: No submental, submandibular, tonsillar, preauricular or occipital adenopathy.      Left side of head: No submental, submandibular, tonsillar, preauricular, posterior auricular or occipital adenopathy.      Cervical: No cervical adenopathy.      Right cervical: No superficial, deep or posterior cervical adenopathy.     Left cervical: No superficial, deep or posterior cervical adenopathy.      Upper Body:      Left upper body: Axillary adenopathy (mildly enlarged single node 1 cm, mildly TTP ) present.   Neurological:      Mental Status: She is alert and oriented to person, place, and time.   Psychiatric:         Behavior: Behavior is cooperative.         REVIEWED DATA:    Labs:     Lab Results   Component Value Date    CREATININE 1.00 03/26/2019       No results found for: HGBA1C, GLUCOSE, MICROALBUR    No results found for: LDL, HDL, TRIG, CHOLHDLRATIO    No results found for: TSH, FREET4    No results found for: WBC, HGB, PLT    No results found for: PROTEIN, GLUCOSEU, BLOODU, NITRITEU, LEUKOCYTESUR    Imaging:         Medical Tests:         Summary of old records / correspondence / consultant report:         Request outside records:         ALLERGIES  Allergies   Allergen Reactions   • Azithromycin Other (See Comments)     Pain to abdomen   • Sulfa Antibiotics Rash     Rash, itching        PFSH:     The following portions of the patient's history were reviewed and updated as appropriate: Allergies / Current Medications / Past Medical History / Surgical History / Social History / Family History    PROBLEM LIST   Patient Active Problem List   Diagnosis   • Disease of lung   • Neurilemmoma   • Seasonal allergic rhinitis   • Spinal stenosis of lumbar region without neurogenic claudication   • Routine health  maintenance   • Intermittent palpitations   • Bilateral primary osteoarthritis of knee   • Neuropathy       PAST MEDICAL HISTORY  Past Medical History:   Diagnosis Date   • Constipation    • Frozen shoulder    • Mediastinal tumor     Schwannoma    • Seasonal allergies        SURGICAL HISTORY  Past Surgical History:   Procedure Laterality Date   • BREAST AUGMENTATION     • LUNG SURGERY  2011    Removal of schwannoma   • TUBAL ABDOMINAL LIGATION         SOCIAL HISTORY  Social History     Socioeconomic History   • Marital status:      Spouse name: Not on file   • Number of children: Not on file   • Years of education: Not on file   • Highest education level: Not on file   Tobacco Use   • Smoking status: Never Smoker   • Smokeless tobacco: Never Used   Substance and Sexual Activity   • Alcohol use: No       FAMILY HISTORY  Family History   Problem Relation Age of Onset   • Brain cancer Mother    • Coronary artery disease Father    • Neuropathy Sister          **Lucrecia Disclaimer:   Much of this encounter note is an electronic transcription/translation of spoken language to printed text. The electronic translation of spoken language may permit erroneous, or at times, nonsensical words or phrases to be inadvertently transcribed. Although I have reviewed the note for such errors, some may still exist.

## 2021-03-09 DIAGNOSIS — Z23 IMMUNIZATION DUE: ICD-10-CM

## 2021-03-29 ENCOUNTER — OFFICE VISIT (OUTPATIENT)
Dept: INTERNAL MEDICINE | Age: 76
End: 2021-03-29

## 2021-03-29 VITALS
OXYGEN SATURATION: 100 % | HEIGHT: 66 IN | TEMPERATURE: 96.4 F | BODY MASS INDEX: 20.73 KG/M2 | DIASTOLIC BLOOD PRESSURE: 76 MMHG | WEIGHT: 129 LBS | HEART RATE: 78 BPM | SYSTOLIC BLOOD PRESSURE: 130 MMHG

## 2021-03-29 DIAGNOSIS — N64.4 BREAST PAIN, RIGHT: Primary | ICD-10-CM

## 2021-03-29 DIAGNOSIS — S20.01XA: ICD-10-CM

## 2021-03-29 DIAGNOSIS — T85.43XD RUPTURED SILICONE BREAST IMPLANT, SUBSEQUENT ENCOUNTER: ICD-10-CM

## 2021-03-29 PROCEDURE — 99213 OFFICE O/P EST LOW 20 MIN: CPT | Performed by: NURSE PRACTITIONER

## 2021-03-29 NOTE — PROGRESS NOTES
"Chief Complaint  Breast Problem    Subjective          Edyta White presents to [facility]  Breast Problem  This is a new problem. Episode onset: 3 weeks ago (occurred when she fell directly on a metal stairrail onto right side of breast)  The problem occurs constantly. The problem has been gradually improving. Associated symptoms comments: Ecchymosis, tenderness, pain to area. Exacerbated by: palpation.   She is overdue for breast MRI.     Objective   Vital Signs:   /76   Pulse 78   Temp 96.4 °F (35.8 °C) (Temporal)   Ht 167.6 cm (65.98\")   Wt 58.5 kg (129 lb)   SpO2 100%   BMI 20.83 kg/m²     Physical Exam  Vitals and nursing note reviewed.   Constitutional:       General: She is not in acute distress.     Appearance: She is well-developed. She is not ill-appearing.   Cardiovascular:      Rate and Rhythm: Normal rate and regular rhythm.      Heart sounds: Normal heart sounds, S1 normal and S2 normal. No murmur heard.     Pulmonary:      Effort: Pulmonary effort is normal.      Breath sounds: Normal breath sounds. No decreased breath sounds, wheezing, rhonchi or rales.   Skin:     General: Skin is warm and dry.   Neurological:      Mental Status: She is alert and oriented to person, place, and time.   Psychiatric:         Speech: Speech normal.         Behavior: Behavior normal. Behavior is cooperative.         Thought Content: Thought content normal.         Judgment: Judgment normal.        Result Review :   The following data was reviewed by: JAH Arguello on 03/29/2021:  Common labs    Common Labsle 9/28/20 9/28/20    [id number]   Glucose  85   BUN  15   Creatinine  0.87   eGFR Non  Am  64   eGFR  Am  78   Sodium  140   Potassium  4.1   Chloride  102   Calcium  9.2   Total Protein  6.5   Albumin  4.60   Total Bilirubin  0.4   Alkaline Phosphatase  71   AST (SGOT)  28   ALT (SGPT)  26   WBC 6.42    Hemoglobin 12.9    Hematocrit 38.4    Platelets 259 "       Comments are available for some flowsheets but are not being displayed.           Data reviewed: Radiologic studies breast MRI 2019          Assessment and Plan    Diagnoses and all orders for this visit:    1. Breast pain, right (Primary)  -     MRI breast bilateral diagnostic w wo contrast; Future    2. Ruptured silicone breast implant, subsequent encounter  -     MRI breast bilateral diagnostic w wo contrast; Future    3. Traumatic ecchymosis of female breast, right, initial encounter  -     MRI breast bilateral diagnostic w wo contrast; Future      Advised patient would recommend Medicare wellness visit with lab updates in the near future.  She does not wish to schedule this at this time.     Follow Up   Return in about 1 year (around 3/29/2022) for Medicare Wellness (Initial) (patient may want to call and schedule later) .  Patient was given instructions and counseling regarding her condition or for health maintenance advice. Please see specific information pulled into the AVS if appropriate.

## 2021-05-25 ENCOUNTER — HOSPITAL ENCOUNTER (OUTPATIENT)
Dept: MRI IMAGING | Facility: HOSPITAL | Age: 76
Discharge: HOME | End: 2021-05-25
Admitting: NURSE PRACTITIONER

## 2021-05-25 DIAGNOSIS — N64.4 BREAST PAIN, RIGHT: ICD-10-CM

## 2021-05-25 DIAGNOSIS — T85.43XD RUPTURED SILICONE BREAST IMPLANT, SUBSEQUENT ENCOUNTER: ICD-10-CM

## 2021-05-25 DIAGNOSIS — R59.0 ENLARGED LYMPH NODES IN ARMPIT: Primary | ICD-10-CM

## 2021-05-25 DIAGNOSIS — S20.01XA: ICD-10-CM

## 2021-05-25 LAB — CREAT BLDA-MCNC: 0.8 MG/DL (ref 0.6–1.3)

## 2021-05-25 PROCEDURE — 82565 ASSAY OF CREATININE: CPT

## 2021-05-25 PROCEDURE — C8937 CAD BREAST MRI: HCPCS

## 2021-05-25 PROCEDURE — C8908 MRI W/O FOL W/CONT, BREAST,: HCPCS

## 2021-05-25 PROCEDURE — A9577 INJ MULTIHANCE: HCPCS | Performed by: NURSE PRACTITIONER

## 2021-05-25 PROCEDURE — 0 GADOBENATE DIMEGLUMINE 529 MG/ML SOLUTION: Performed by: NURSE PRACTITIONER

## 2021-05-25 RX ADMIN — GADOBENATE DIMEGLUMINE 11 ML: 529 INJECTION, SOLUTION INTRAVENOUS at 11:19

## 2021-05-26 ENCOUNTER — TELEPHONE (OUTPATIENT)
Dept: INTERNAL MEDICINE | Age: 76
End: 2021-05-26

## 2021-05-26 NOTE — TELEPHONE ENCOUNTER
Pateint was calling back to go over results and was returning your phone call , Best time to call patient is around 1 pm   Patients contact info is correct In chart

## 2021-06-03 ENCOUNTER — TELEPHONE (OUTPATIENT)
Dept: INTERNAL MEDICINE | Age: 76
End: 2021-06-03

## 2021-06-03 NOTE — TELEPHONE ENCOUNTER
PATIENT STATES:THAT SHE WOULD NOT TAKE THE TEST BUT SHE DON'T KNOW THE NAME OF THE TEST PLEASE ADVISE      PATIENT CAN BE REACHED ON: [phone]

## 2022-03-21 ENCOUNTER — TRANSCRIBE ORDERS (OUTPATIENT)
Dept: ADMINISTRATIVE | Facility: HOSPITAL | Age: 77
End: 2022-03-21

## 2022-03-21 DIAGNOSIS — M25.569 KNEE PAIN, UNSPECIFIED CHRONICITY, UNSPECIFIED LATERALITY: Primary | ICD-10-CM

## 2022-04-08 ENCOUNTER — HOSPITAL ENCOUNTER (OUTPATIENT)
Dept: NUCLEAR MEDICINE | Facility: HOSPITAL | Age: 77
Discharge: HOME | End: 2022-04-08

## 2022-04-08 DIAGNOSIS — M25.569 KNEE PAIN, UNSPECIFIED CHRONICITY, UNSPECIFIED LATERALITY: ICD-10-CM

## 2022-04-08 PROCEDURE — A9503 TC99M MEDRONATE: HCPCS | Performed by: ORTHOPAEDIC SURGERY

## 2022-04-08 PROCEDURE — 0 TECHNETIUM MEDRONATE KIT: Performed by: ORTHOPAEDIC SURGERY

## 2022-04-08 PROCEDURE — 78315 BONE IMAGING 3 PHASE: CPT

## 2022-04-08 RX ORDER — TC 99M MEDRONATE 20 MG/10ML
22.8 INJECTION, POWDER, LYOPHILIZED, FOR SOLUTION INTRAVENOUS
Status: COMPLETED | OUTPATIENT
Start: 2022-04-08 | End: 2022-04-08

## 2022-04-08 RX ADMIN — Medication 22.8 MILLICURIE: at 09:00

## 2022-04-19 ENCOUNTER — TELEPHONE (OUTPATIENT)
Dept: GASTROENTEROLOGY | Facility: CLINIC | Age: 77
End: 2022-04-19

## 2022-05-19 ENCOUNTER — OFFICE VISIT (OUTPATIENT)
Dept: GASTROENTEROLOGY | Facility: CLINIC | Age: 77
End: 2022-05-19

## 2022-05-19 VITALS
OXYGEN SATURATION: 97 % | HEART RATE: 83 BPM | TEMPERATURE: 98.5 F | HEIGHT: 66 IN | DIASTOLIC BLOOD PRESSURE: 64 MMHG | SYSTOLIC BLOOD PRESSURE: 116 MMHG | BODY MASS INDEX: 20.79 KG/M2 | WEIGHT: 129.4 LBS

## 2022-05-19 DIAGNOSIS — K56.699 DIVERTICULAR STRICTURE: Primary | ICD-10-CM

## 2022-05-19 PROCEDURE — 99204 OFFICE O/P NEW MOD 45 MIN: CPT | Performed by: INTERNAL MEDICINE

## 2022-05-19 NOTE — PROGRESS NOTES
"Chief Complaint   Patient presents with   • GI Problem         History of Present Illness  75 yo female asked to see by dr sanon as pt needs knee replacement but h/o colonic stricture that was \"so bad at one point discussed resection\"    There are plans for knee replacement, but this is on hold until she is evaluated and treated for colonic stricture. She reports there was concern about pain medication exacerbating her underlying GI issues.    Patient reports in 2017 she developed severe constipation. She had a colonoscopy at age 52 and had a second colonoscopy in 2018. She was told she had narrowing of her colon and surgical consultation was recommended. She did not pursue this.    Patient did not have surgical consultation. She followed with a Chinese medicine provider who gave her an herbal prescription for constipation that she takes daily. With use of this, she is no longer experiencing constipation.    Colonoscopy in 2018 showed diverticulosis with luminal narrowing, tortuous colon and sigmoid narrowing with colonic distension proximal to the sigmoid. She had an EGD in 2018 that showed a Schatzki's ring that was dilated, gastritis.    Patient denies any issues with dysphagia.    Review of Systems     Result Review :           Vital Signs:   /64   Pulse 83   Temp 98.5 °F (36.9 °C)   Ht 166.4 cm (65.5\")   Wt 58.7 kg (129 lb 6.4 oz)   SpO2 97%   BMI 21.21 kg/m²     Body mass index is 21.21 kg/m².     Physical Exam  Vitals reviewed.   Constitutional:       Appearance: Normal appearance.   Abdominal:      General: Bowel sounds are normal. There is no distension.      Palpations: Abdomen is soft. Abdomen is not rigid. There is no mass or pulsatile mass.      Tenderness: There is no abdominal tenderness. There is no guarding or rebound.           Assessment and Plan    Diagnoses and all orders for this visit:    1. Diverticular stricture (HCC) (Primary)  -     CT Virtual Colonoscopy Diagnostic; Future     "   Discussed consideration for colonoscopy versus CT colonography for reassessment of her diverticular stricture and to assess overall level of concern and concern for constipation developing in postoperative period.        I have reviewed and confirmed the accuracy of the HPI and Assessment and Plan as documented by the APRN JAH Leslie        Follow Up   Return for Follow up to review results after testing complete.    Patient Instructions   Schedule CT colonoscopy for further evaluation of diverticular stricture.        Documentation by Beverly TYSON acting as a scribe in the following sections on behalf of the billable provider: HPI, ROS, assessment, & plan.

## 2022-06-15 ENCOUNTER — TELEPHONE (OUTPATIENT)
Dept: GASTROENTEROLOGY | Facility: CLINIC | Age: 77
End: 2022-06-15

## 2022-06-16 DIAGNOSIS — K56.699 DIVERTICULAR STRICTURE: ICD-10-CM

## 2022-06-17 ENCOUNTER — TELEPHONE (OUTPATIENT)
Dept: GASTROENTEROLOGY | Facility: CLINIC | Age: 77
End: 2022-06-17

## 2022-06-17 NOTE — TELEPHONE ENCOUNTER
Please let patient know I will need to review the results with Dr. Cage and we will contact her with further recommendations.

## 2022-06-17 NOTE — TELEPHONE ENCOUNTER
Patient would like to know the results and recommendations of the virtual colonoscopy she had done at [facility]. Scan is now on the chart. Patient states she and Dr. Cage are going to decide if she needs sx or not based on report. Will forward info to provider.

## 2022-06-24 ENCOUNTER — TELEPHONE (OUTPATIENT)
Dept: GASTROENTEROLOGY | Facility: CLINIC | Age: 77
End: 2022-06-24

## 2022-06-24 DIAGNOSIS — Z12.11 ENCOUNTER FOR SCREENING FOR MALIGNANT NEOPLASM OF COLON: ICD-10-CM

## 2022-06-24 DIAGNOSIS — I72.8 SPLENIC ARTERY ANEURYSM: Primary | ICD-10-CM

## 2022-06-24 NOTE — TELEPHONE ENCOUNTER
Patient called for results of virtual colonoscopy. Patient states she has had to cancel her knee replacement sx twice because she wants the results of the scan in order to proceed with the sx. Patient has read the results online and has questions for a provider. Patient asks that Dr. Cage or an ARNP contact her. Will consult.

## 2022-09-07 ENCOUNTER — PREP FOR SURGERY (OUTPATIENT)
Dept: OTHER | Facility: HOSPITAL | Age: 77
End: 2022-09-07

## 2022-09-07 DIAGNOSIS — M17.11 PRIMARY OSTEOARTHRITIS OF RIGHT KNEE: Primary | ICD-10-CM

## 2022-09-07 RX ORDER — CHLORHEXIDINE GLUCONATE 500 MG/1
CLOTH TOPICAL ONCE
Status: CANCELLED | OUTPATIENT
Start: 2022-10-31 | End: 2022-09-07

## 2022-09-07 RX ORDER — CEFAZOLIN SODIUM 2 G/100ML
2 INJECTION, SOLUTION INTRAVENOUS ONCE
Status: CANCELLED | OUTPATIENT
Start: 2022-10-31 | End: 2022-09-07

## 2022-09-07 RX ORDER — CHLORHEXIDINE GLUCONATE 500 MG/1
CLOTH TOPICAL 2 TIMES DAILY
Status: CANCELLED | OUTPATIENT
Start: 2022-09-07

## 2022-09-07 RX ORDER — ACETAMINOPHEN 500 MG
1000 TABLET ORAL ONCE
Status: CANCELLED | OUTPATIENT
Start: 2022-10-31 | End: 2022-09-07

## 2022-09-07 RX ORDER — PREGABALIN 75 MG/1
75 CAPSULE ORAL ONCE
Status: CANCELLED | OUTPATIENT
Start: 2022-10-31 | End: 2022-09-07

## 2022-10-10 ENCOUNTER — APPOINTMENT (OUTPATIENT)
Dept: PREADMISSION TESTING | Facility: HOSPITAL | Age: 77
End: 2022-10-10

## 2023-03-24 ENCOUNTER — LAB (OUTPATIENT)
Dept: LAB | Facility: HOSPITAL | Age: 78
End: 2023-03-24
Payer: MEDICARE

## 2023-03-24 ENCOUNTER — TRANSCRIBE ORDERS (OUTPATIENT)
Dept: ADMINISTRATIVE | Facility: HOSPITAL | Age: 78
End: 2023-03-24
Payer: MEDICARE

## 2023-03-24 DIAGNOSIS — M25.561 RIGHT KNEE PAIN, UNSPECIFIED CHRONICITY: ICD-10-CM

## 2023-03-24 DIAGNOSIS — M25.561 RIGHT KNEE PAIN, UNSPECIFIED CHRONICITY: Primary | ICD-10-CM

## 2023-03-24 LAB
BASOPHILS # BLD AUTO: 0.04 10*3/MM3 (ref 0–0.2)
BASOPHILS NFR BLD AUTO: 0.6 % (ref 0–1.5)
CRP SERPL-MCNC: <0.3 MG/DL (ref 0–0.5)
DEPRECATED RDW RBC AUTO: 44.1 FL (ref 37–54)
EOSINOPHIL # BLD AUTO: 0.21 10*3/MM3 (ref 0–0.4)
EOSINOPHIL NFR BLD AUTO: 3.3 % (ref 0.3–6.2)
ERYTHROCYTE [DISTWIDTH] IN BLOOD BY AUTOMATED COUNT: 13.7 % (ref 12.3–15.4)
ERYTHROCYTE [SEDIMENTATION RATE] IN BLOOD: 6 MM/HR (ref 0–30)
HCT VFR BLD AUTO: 37.9 % (ref 34–46.6)
HGB BLD-MCNC: 12.6 G/DL (ref 12–15.9)
IMM GRANULOCYTES # BLD AUTO: 0.01 10*3/MM3 (ref 0–0.05)
IMM GRANULOCYTES NFR BLD AUTO: 0.2 % (ref 0–0.5)
LYMPHOCYTES # BLD AUTO: 2.24 10*3/MM3 (ref 0.7–3.1)
LYMPHOCYTES NFR BLD AUTO: 35.5 % (ref 19.6–45.3)
MCH RBC QN AUTO: 29.3 PG (ref 26.6–33)
MCHC RBC AUTO-ENTMCNC: 33.2 G/DL (ref 31.5–35.7)
MCV RBC AUTO: 88.1 FL (ref 79–97)
MONOCYTES # BLD AUTO: 0.41 10*3/MM3 (ref 0.1–0.9)
MONOCYTES NFR BLD AUTO: 6.5 % (ref 5–12)
NEUTROPHILS NFR BLD AUTO: 3.4 10*3/MM3 (ref 1.7–7)
NEUTROPHILS NFR BLD AUTO: 53.9 % (ref 42.7–76)
NRBC BLD AUTO-RTO: 0 /100 WBC (ref 0–0.2)
PLATELET # BLD AUTO: 292 10*3/MM3 (ref 140–450)
PMV BLD AUTO: 10.2 FL (ref 6–12)
RBC # BLD AUTO: 4.3 10*6/MM3 (ref 3.77–5.28)
WBC NRBC COR # BLD: 6.31 10*3/MM3 (ref 3.4–10.8)

## 2023-03-24 PROCEDURE — 86140 C-REACTIVE PROTEIN: CPT

## 2023-03-24 PROCEDURE — 85652 RBC SED RATE AUTOMATED: CPT

## 2023-03-24 PROCEDURE — 85025 COMPLETE CBC W/AUTO DIFF WBC: CPT

## 2023-03-24 PROCEDURE — 36415 COLL VENOUS BLD VENIPUNCTURE: CPT

## 2023-05-01 ENCOUNTER — TELEPHONE (OUTPATIENT)
Dept: ORTHOPEDIC SURGERY | Facility: CLINIC | Age: 78
End: 2023-05-01
Payer: MEDICARE